# Patient Record
(demographics unavailable — no encounter records)

---

## 2025-07-22 NOTE — ASSESSMENT
[FreeTextEntry1] : 63-year-old with elevated PSA. Retired [employer] and [employer]  part of the [employer] program.  He understands that PSA is a screening tool using a diagnosis of prostate cancer. Significance of his social history outlined. Recommend MRI of prostate to evaluate for any suspicious prostatic lesions that will be targeted on MRI guided fusion biopsy. MRI will also allow for measurement of prostate volume and calculation of PSA density.  Time allotted for patient asked questions.  All questions answered.  Plan - MRI - Follow-up in 3 to 4 weeks to review with attending.  Total time of encounter: 30 minutes.  This is not including separately reported services or education.

## 2025-07-22 NOTE — HISTORY OF PRESENT ILLNESS
[FreeTextEntry1] : KIRSTIN VILLA is a 63 year old male who presents for consultation for elevated PSA.  Reports urinating well.  Denies any difficulties urinating. Denies gross hematuria, dysuria or associated symptoms.   Denies  PMH including previous kidney stones, recurrent UTIs.  Family History: No  malignancies Social History: [facility], [employer] .  Labs reviewed: PSA 06/2025- 6.2 ng/mL 16% free.  Creatinine 1.19 eGFR 69 UA negative for blood, nitrites, leukocytes.  Consultation requested by Dr. Yury Alonso